# Patient Record
Sex: FEMALE | Race: WHITE | NOT HISPANIC OR LATINO | URBAN - METROPOLITAN AREA
[De-identification: names, ages, dates, MRNs, and addresses within clinical notes are randomized per-mention and may not be internally consistent; named-entity substitution may affect disease eponyms.]

---

## 2023-10-23 ENCOUNTER — OFFICE (OUTPATIENT)
Dept: URBAN - METROPOLITAN AREA CLINIC 72 | Facility: CLINIC | Age: 58
End: 2023-10-23

## 2023-10-23 VITALS
HEART RATE: 84 BPM | WEIGHT: 213 LBS | DIASTOLIC BLOOD PRESSURE: 86 MMHG | SYSTOLIC BLOOD PRESSURE: 136 MMHG | HEIGHT: 65 IN

## 2023-10-23 DIAGNOSIS — Z86.010 PERSONAL HISTORY OF COLONIC POLYPS: ICD-10-CM

## 2023-10-23 DIAGNOSIS — K57.90 DIVERTICULOSIS OF INTESTINE, PART UNSPECIFIED, WITHOUT PERFO: ICD-10-CM

## 2023-10-23 DIAGNOSIS — Z87.19 PERSONAL HISTORY OF OTHER DISEASES OF THE DIGESTIVE SYSTEM: ICD-10-CM

## 2023-10-23 PROCEDURE — 99203 OFFICE O/P NEW LOW 30 MIN: CPT | Performed by: NURSE PRACTITIONER

## 2023-10-23 RX ORDER — POLYETHYLENE GLYCOL 3350, SODIUM SULFATE, SODIUM CHLORIDE, POTASSIUM CHLORIDE, ASCORBIC ACID, SODIUM ASCORBATE 140-9-5.2G
KIT ORAL
Qty: 1 | Refills: 0 | Status: ACTIVE
Start: 2023-10-23

## 2023-10-23 NOTE — SERVICEHPINOTES
Pleasant 58-year-old followed by Dr. Matthews for history of diverticulitis, history of adenomatous polyps.
br 
She is currently doing well.  She denies abdominal pain, weight loss, bowel changes, signs of GI bleeding.br
br   Labs
br 3/2023–WBC 18, hemoglobin 14
br
br Imaging
br 3/2023- CT abdomen and pelvis with contrast–acute uncomplicated colitis descending and sigmoid colon
br
7/2017–CT–abdomen and pelvis with contrast–sigmoid diverticulitis
br
br
Procedures
br
3/2019–colonoscopy–Dr. Matthews- 5 mm polyp, descending colon–a sessile serrated adenoma negative dysplasia